# Patient Record
Sex: MALE | Race: WHITE | NOT HISPANIC OR LATINO | URBAN - METROPOLITAN AREA
[De-identification: names, ages, dates, MRNs, and addresses within clinical notes are randomized per-mention and may not be internally consistent; named-entity substitution may affect disease eponyms.]

---

## 2021-04-15 ENCOUNTER — OFFICE VISIT (OUTPATIENT)
Dept: URBAN - METROPOLITAN AREA CLINIC 98 | Facility: CLINIC | Age: 66
End: 2021-04-15
Payer: MEDICARE

## 2021-04-15 VITALS
WEIGHT: 198.8 LBS | DIASTOLIC BLOOD PRESSURE: 83 MMHG | HEIGHT: 68 IN | HEART RATE: 106 BPM | SYSTOLIC BLOOD PRESSURE: 121 MMHG | BODY MASS INDEX: 30.13 KG/M2 | TEMPERATURE: 96.8 F

## 2021-04-15 DIAGNOSIS — T50.Z95A ADVERSE EFFECT OF OTHER VACCINES AND BIOLOGICAL SUBSTANCES, INITIAL ENCOUNTER: ICD-10-CM

## 2021-04-15 DIAGNOSIS — R11.10 VOMITING, INTRACTABILITY OF VOMITING NOT SPECIFIED, PRESENCE OF NAUSEA NOT SPECIFIED, UNSPECIFIED VOMITING TYPE: ICD-10-CM

## 2021-04-15 DIAGNOSIS — R19.7 DIARRHEA, UNSPECIFIED: ICD-10-CM

## 2021-04-15 DIAGNOSIS — R11.0 NAUSEA: ICD-10-CM

## 2021-04-15 PROBLEM — 446994009: Status: ACTIVE | Noted: 2021-04-15

## 2021-04-15 PROBLEM — 422400008: Status: ACTIVE | Noted: 2021-04-15

## 2021-04-15 PROBLEM — 51615001: Status: ACTIVE | Noted: 2021-04-15

## 2021-04-15 PROBLEM — 62315008: Status: ACTIVE | Noted: 2021-04-15

## 2021-04-15 PROBLEM — 931000119107: Status: ACTIVE | Noted: 2021-04-15

## 2021-04-15 PROBLEM — 422587007: Status: ACTIVE | Noted: 2021-04-15

## 2021-04-15 PROCEDURE — 99204 OFFICE O/P NEW MOD 45 MIN: CPT | Performed by: INTERNAL MEDICINE

## 2021-04-15 RX ORDER — BENAZEPRIL HYDROCHLORIDE AND HYDROCHLOROTHIAZIDE 20; 12.5 MG/1; MG/1
TABLET, FILM COATED ORAL
Qty: 0 | Refills: 0 | Status: ACTIVE | COMMUNITY
Start: 2017-01-18 | End: 1900-01-01

## 2021-04-15 NOTE — HPI-TODAY'S VISIT:
Patient had colonoscopy February 2017.  Hepatic flexure polyp removed.  Benign polyp.  EGD was done February 2017.  Biopsy negative for H. pylori. 4/15/21 vomiting and diarrhea after covid shot has pulmonaly fibrosos  on Ofev on Lasix SOB severe diarrhea persists nausea persists eating/drinking very little past week

## 2021-07-02 ENCOUNTER — OFFICE VISIT (OUTPATIENT)
Dept: URBAN - METROPOLITAN AREA TELEHEALTH 2 | Facility: TELEHEALTH | Age: 66
End: 2021-07-02
Payer: MEDICARE

## 2021-07-02 DIAGNOSIS — K52.9 CHRONIC DIARRHEA: ICD-10-CM

## 2021-07-02 DIAGNOSIS — Z85.038 PERSONAL HISTORY OF COLON CANCER: ICD-10-CM

## 2021-07-02 PROCEDURE — 99213 OFFICE O/P EST LOW 20 MIN: CPT | Performed by: INTERNAL MEDICINE

## 2021-07-02 PROCEDURE — 99243 OFF/OP CNSLTJ NEW/EST LOW 30: CPT | Performed by: INTERNAL MEDICINE

## 2021-07-02 RX ORDER — BENAZEPRIL HYDROCHLORIDE AND HYDROCHLOROTHIAZIDE 20; 12.5 MG/1; MG/1
TABLET, FILM COATED ORAL
Qty: 0 | Refills: 0 | Status: ACTIVE | COMMUNITY
Start: 2017-01-18

## 2021-07-02 NOTE — HPI-TODAY'S VISIT:
Patient referred by Maikel Gallo MD for evaluation of chronic diarrhea. This is a Telehealth OV to which patient has agreed to. Limitations of telehealth discussed; he understands and agrees to proceed. Copy of this consult OV sent to Dr. Gallo. 67 yo pt w/ c/o intermittent and chronic diarrhea x 1 mo: watery, non-bloody stools 6 to 7 x/d,  w/ mucus and no bleeding, w/ nocturnal diarrhea w/o abdominal cramping pain, ++ borborygmi, w/o tenesmus, urgency nor incontinence. Denies melenic stools and no hematochezia. No recent change in diet nor use of abx's. He started Ofev 150 mg bid for Rx Pulmonary fibrosis.  Has had no fever or chills. No anorexia or weight loss. He had a screening colonoscopy at another facility 1/21: few colon polyps. Since his colonoscopy, he has been c/o perianal - rectal burning discomfort w/ evacuation. No other complaints. No FHx IBD. Had COVID-19 a wk after first dose of COVID-19 vaccine. Has recieved second dose of COVID-19 vaccine.

## 2021-07-06 PROBLEM — 429699009: Status: ACTIVE | Noted: 2021-07-06

## 2023-02-11 ENCOUNTER — OFFICE VISIT (OUTPATIENT)
Dept: URBAN - METROPOLITAN AREA TELEHEALTH 2 | Facility: TELEHEALTH | Age: 68
End: 2023-02-11
Payer: MEDICARE

## 2023-02-11 DIAGNOSIS — K59.01 CONSTIPATION BY DELAYED COLONIC TRANSIT: ICD-10-CM

## 2023-02-11 DIAGNOSIS — K21.9 GERD WITHOUT ESOPHAGITIS: ICD-10-CM

## 2023-02-11 DIAGNOSIS — Z85.038 PERSONAL HISTORY OF COLON CANCER, STAGE I: ICD-10-CM

## 2023-02-11 DIAGNOSIS — K22.2 SCHATZKI'S RING OF DISTAL ESOPHAGUS: ICD-10-CM

## 2023-02-11 PROBLEM — 235623002: Status: ACTIVE | Noted: 2023-02-11

## 2023-02-11 PROBLEM — 35298007: Status: ACTIVE | Noted: 2023-02-11

## 2023-02-11 PROBLEM — 161672008: Status: ACTIVE | Noted: 2023-02-11

## 2023-02-11 PROBLEM — 429699009: Status: ACTIVE | Noted: 2023-02-11

## 2023-02-11 PROBLEM — 266435005: Status: ACTIVE | Noted: 2023-02-11

## 2023-02-11 PROCEDURE — 99214 OFFICE O/P EST MOD 30 MIN: CPT | Performed by: INTERNAL MEDICINE

## 2023-02-11 RX ORDER — BENAZEPRIL HYDROCHLORIDE AND HYDROCHLOROTHIAZIDE 20; 12.5 MG/1; MG/1
TABLET, FILM COATED ORAL
Qty: 0 | Refills: 0 | Status: ACTIVE | COMMUNITY
Start: 2017-01-18

## 2023-02-11 NOTE — HPI-TODAY'S VISIT:
This is a Telehealth OV to which patient has agreed to. Limitations of telehealth discussed; he understands and agrees to proceed. Clem's wife present during this virtual OV. 68 yo pt  s/p Lung transplant 1/21 @ [facility] in [place], who present this time for evaluation of constipation. He reports that since he started on Clonidine, he has noted constipation, w bm's q 4 to 5 days, w lower abdominal distention and discomfort. Has tried MOM prn w good results but w diarrheal stools. No melenic stools and no hematochezia. No constitutional sxs. On a healthy diet. No anorexia or weight loss. He had a screening colonoscopy at another facility 1/21: few colon polyps. Denies cardiorespiratory sxs.  He has been experiencing significant nausea from Atovaquoane for prophylaxis. He had a good tolerance to Bactrim, but had hyperkalemia from it. No other complaints. EGD 2/17: NERD, Schatzki's ring dilated w savary dilator; Colonoscopy 2/17: colon polyp, Hrrds.  Current MEDS: Clonidine, Prograf 6 mg bid, Prednisone 5 mg, FUR 10 mg, Metoprolol 12.5 bid, Hydralazine 50 tid, Pantoprazole 40, Ca carbonate 650 bid, Pregabalin 50, Bupropion 100 Atovaquone 750 mg qd, Vitamin C 500, Colace.

## 2023-02-16 ENCOUNTER — TELEPHONE ENCOUNTER (OUTPATIENT)
Dept: URBAN - METROPOLITAN AREA CLINIC 98 | Facility: CLINIC | Age: 68
End: 2023-02-16

## 2023-04-25 ENCOUNTER — TELEPHONE ENCOUNTER (OUTPATIENT)
Dept: URBAN - METROPOLITAN AREA CLINIC 98 | Facility: CLINIC | Age: 68
End: 2023-04-25

## 2023-05-08 ENCOUNTER — OFFICE VISIT (OUTPATIENT)
Dept: URBAN - METROPOLITAN AREA SURGERY CENTER 18 | Facility: SURGERY CENTER | Age: 68
End: 2023-05-08

## 2023-05-09 ENCOUNTER — OFFICE VISIT (OUTPATIENT)
Dept: URBAN - METROPOLITAN AREA MEDICAL CENTER 39 | Facility: MEDICAL CENTER | Age: 68
End: 2023-05-09
Payer: MEDICARE

## 2023-05-09 ENCOUNTER — DASHBOARD ENCOUNTERS (OUTPATIENT)
Age: 68
End: 2023-05-09

## 2023-05-09 DIAGNOSIS — Z85.038 H/O COLON CANCER, STAGE I: ICD-10-CM

## 2023-05-09 DIAGNOSIS — K29.60 ADENOPAPILLOMATOSIS GASTRICA: ICD-10-CM

## 2023-05-09 DIAGNOSIS — D12.3 ADENOMA OF TRANSVERSE COLON: ICD-10-CM

## 2023-05-09 DIAGNOSIS — K31.A11 GASTRIC INTESTINAL METAPLASIA WITHOUT DYSPLASIA, INVOLVING THE ANTRUM: ICD-10-CM

## 2023-05-09 DIAGNOSIS — K63.89 APPENDICITIS EPIPLOICA: ICD-10-CM

## 2023-05-09 PROCEDURE — 45380 COLONOSCOPY AND BIOPSY: CPT | Performed by: INTERNAL MEDICINE

## 2023-05-09 PROCEDURE — 43239 EGD BIOPSY SINGLE/MULTIPLE: CPT | Performed by: INTERNAL MEDICINE

## 2023-05-09 RX ORDER — BENAZEPRIL HYDROCHLORIDE AND HYDROCHLOROTHIAZIDE 20; 12.5 MG/1; MG/1
TABLET, FILM COATED ORAL
Qty: 0 | Refills: 0 | Status: ACTIVE | COMMUNITY
Start: 2017-01-18